# Patient Record
(demographics unavailable — no encounter records)

---

## 2024-11-14 NOTE — HISTORY OF PRESENT ILLNESS
[de-identified] : 45-year-old male presents for annual wellness visit as well as follow-up fasting labs and medication management. Has a history of hypertension, obesity and anxiety. Has been generally well without any recent illness. Has been compliant with his medication.   Has ongoing problems with inability to lose weight even when using phentermine along with diet and exercise.

## 2024-11-14 NOTE — REVIEW OF SYSTEMS
[Recent Change In Weight] : ~T recent weight change [Anxiety] : anxiety [Fever] : no fever [Chest Pain] : no chest pain [Palpitations] : no palpitations [Shortness Of Breath] : no shortness of breath [Abdominal Pain] : no abdominal pain [Dysuria] : no dysuria [Headache] : no headache [Dizziness] : no dizziness [Fainting] : no fainting

## 2024-11-14 NOTE — PHYSICAL EXAM
[No Acute Distress] : no acute distress [No JVD] : no jugular venous distention [Clear to Auscultation] : lungs were clear to auscultation bilaterally [Regular Rhythm] : with a regular rhythm [Normal S1, S2] : normal S1 and S2 [No Murmur] : no murmur heard [No Edema] : there was no peripheral edema [No Focal Deficits] : no focal deficits [Alert and Oriented x3] : oriented to person, place, and time [de-identified] : Benign [de-identified] : Areas of hypopigmentation across his lower neck and upper shoulder area

## 2024-11-14 NOTE — ASSESSMENT
[FreeTextEntry1] : His exam is remarkable for his weight at 196 pounds. Fasting labs including lipid profile was sent.  Hypertension-blood pressure remains well controlled on Toprol-XL 50 mg daily.  Anxiety-does follow with a psychiatrist.    Obesity-his weight essentially has remained unchanged.  Having ongoing difficulty losing weight. Is asking about options including medications.  Will try Wegovy 0.25 mg weekly and then call in 1 month with his progress and to possibly increase the dose as needed. Attending note. Patient was seen in fast track room #4. Patient states she went to get refills from her physician's office and would not provide care unless patient was able to play to his it was being billed to her and to her insurance-as per patient. Patient is requesting a prescription refill for Xanax 2 mg b.i.d. and Adderall 20 mg b.i.d. patient last week her medication yesterday.

## 2024-11-14 NOTE — HEALTH RISK ASSESSMENT
[No] : In the past 12 months have you used drugs other than those required for medical reasons? No [0] : 2) Feeling down, depressed, or hopeless: Not at all (0) [PHQ-2 Negative - No further assessment needed] : PHQ-2 Negative - No further assessment needed [Former] : Former [5-9] : 5-9 [< 15 Years] : < 15 Years [[id number]] : 0

## 2025-05-12 NOTE — HISTORY OF PRESENT ILLNESS
[FreeTextEntry8] : Presents with a 2-week history of ongoing intermittent discomfort/burning with urination.  Also has had pain in the left lower inguinal area. No fever or chills.  No discharge.  The area is not tender to palpation.

## 2025-05-12 NOTE — PHYSICAL EXAM
[No Acute Distress] : no acute distress [de-identified] : Scrotal skin is unremarkable.  Both testicles are without tenderness, swelling nodules.  No hernia appreciated.

## 2025-05-12 NOTE — ASSESSMENT
[FreeTextEntry1] : Dysuria/inguinal pain-has had prostatitis in the past but we discussed that this does not seem to be consistent with that at the present time.  Also discussed that there is no evidence of hernia causing the pain. Was given a prescription for Levaquin 500 mg and was told to fill it if he finds over the next few days there is no significant change with increased hydration.